# Patient Record
Sex: FEMALE | Race: BLACK OR AFRICAN AMERICAN | NOT HISPANIC OR LATINO | URBAN - METROPOLITAN AREA
[De-identification: names, ages, dates, MRNs, and addresses within clinical notes are randomized per-mention and may not be internally consistent; named-entity substitution may affect disease eponyms.]

---

## 2023-03-13 ENCOUNTER — OFFICE (OUTPATIENT)
Dept: URBAN - METROPOLITAN AREA CLINIC 102 | Facility: CLINIC | Age: 68
End: 2023-03-13
Payer: MEDICAID

## 2023-03-13 VITALS
WEIGHT: 155 LBS | TEMPERATURE: 97.5 F | HEART RATE: 91 BPM | SYSTOLIC BLOOD PRESSURE: 128 MMHG | DIASTOLIC BLOOD PRESSURE: 79 MMHG | HEIGHT: 63 IN

## 2023-03-13 DIAGNOSIS — R93.89 ABNORMAL FINDINGS ON DIAGNOSTIC IMAGING OF OTHER SPECIFIED B: ICD-10-CM

## 2023-03-13 DIAGNOSIS — R74.8 ABNORMAL LEVELS OF OTHER SERUM ENZYMES: ICD-10-CM

## 2023-03-13 DIAGNOSIS — Z78.9 OTHER SPECIFIED HEALTH STATUS: ICD-10-CM

## 2023-03-13 DIAGNOSIS — K76.0 FATTY (CHANGE OF) LIVER, NOT ELSEWHERE CLASSIFIED: ICD-10-CM

## 2023-03-13 LAB
AFP, SERUM, TUMOR MARKER: 2.7 NG/ML (ref 0–9.2)
ALPHA-1-ANTITRYPSIN, SERUM: 162 MG/DL (ref 101–187)
ANTI-MITOCHONDRIAL AB BY IFA: (no result)
ANTI-SMOOTH MUSCLE AB BY IFA: (no result)
ANTINUCLEAR ANTIBODIES, IFA: NEGATIVE
HEPATIC FUNCTION PANEL (7): ALBUMIN: 4.4 G/DL (ref 3.8–4.8)
HEPATIC FUNCTION PANEL (7): ALKALINE PHOSPHATASE: 85 IU/L (ref 44–121)
HEPATIC FUNCTION PANEL (7): ALT (SGPT): 50 IU/L — HIGH (ref 0–32)
HEPATIC FUNCTION PANEL (7): AST (SGOT): 92 IU/L — HIGH (ref 0–40)
HEPATIC FUNCTION PANEL (7): BILIRUBIN, DIRECT: <0.1 MG/DL
HEPATIC FUNCTION PANEL (7): BILIRUBIN, TOTAL: 0.2 MG/DL (ref 0–1.2)
HEPATIC FUNCTION PANEL (7): PROTEIN, TOTAL: 7.8 G/DL (ref 6–8.5)
IMMUNOGLOBULIN G, QN, SERUM: 1362 MG/DL (ref 586–1602)
LIVER-KIDNEY MICROSOMAL AB: 0.8 UNITS (ref 0–20)
MITOCHONDRIAL (M2) ANTIBODY: 91.9 UNITS — HIGH (ref 0–20)
PROTHROMBIN TIME (PT): INR: 1 (ref 0.9–1.2)
PROTHROMBIN TIME (PT): PROTHROMBIN TIME: 10.5 SEC (ref 9.1–12)

## 2023-03-13 PROCEDURE — 99204 OFFICE O/P NEW MOD 45 MIN: CPT | Performed by: PHYSICIAN ASSISTANT

## 2023-03-13 NOTE — SERVICEHPINOTES
The pt is a 67 yr old female here to discuss high LFTS. The LFTS have been high for some time--I see labs dating back to 9/2021 with an AST of 131 and ALT of 80.8 at that time. Most recent blood work from 10/22 shows a normal HA1C, and the following neg tests--hep C, hep B surface antigen and antibody with normal ferritin and ceruloplasmin isn't low. AST is 89 and ALT is 48, normal alk phos and bilirubin. U/S shows a fatty liver with nodular contour--possible early cirrhosis.  No abdominal pain or problems eating. No known family hx of GI issues. She tells me that she used to drink on weekends--she tells me that she used to "party a lot back in the day". Unclear how much she was actually drinking (evasive when answering this question). She has since stopped ETOH around October 2022. She takes turmeric and garlic supplements. No other GI related complaints today.

## 2023-03-23 ENCOUNTER — OFFICE (OUTPATIENT)
Dept: URBAN - METROPOLITAN AREA CLINIC 102 | Facility: CLINIC | Age: 68
End: 2023-03-23

## 2023-03-23 DIAGNOSIS — K76.0 FATTY (CHANGE OF) LIVER, NOT ELSEWHERE CLASSIFIED: ICD-10-CM

## 2023-03-23 DIAGNOSIS — R74.8 ABNORMAL LEVELS OF OTHER SERUM ENZYMES: ICD-10-CM

## 2023-03-23 DIAGNOSIS — Z78.9 OTHER SPECIFIED HEALTH STATUS: ICD-10-CM

## 2023-03-23 PROCEDURE — 76981 USE PARENCHYMA: CPT | Performed by: INTERNAL MEDICINE

## 2023-05-08 ENCOUNTER — OFFICE (OUTPATIENT)
Dept: URBAN - METROPOLITAN AREA CLINIC 102 | Facility: CLINIC | Age: 68
End: 2023-05-08
Payer: MEDICARE

## 2023-05-08 VITALS
WEIGHT: 156 LBS | DIASTOLIC BLOOD PRESSURE: 82 MMHG | HEART RATE: 98 BPM | TEMPERATURE: 97.8 F | SYSTOLIC BLOOD PRESSURE: 144 MMHG | HEIGHT: 63 IN

## 2023-05-08 DIAGNOSIS — R93.89 ABNORMAL FINDINGS ON DIAGNOSTIC IMAGING OF OTHER SPECIFIED B: ICD-10-CM

## 2023-05-08 DIAGNOSIS — R89.9 UNSPECIFIED ABNORMAL FINDING IN SPECIMENS FROM OTHER ORGANS,: ICD-10-CM

## 2023-05-08 DIAGNOSIS — R74.8 ABNORMAL LEVELS OF OTHER SERUM ENZYMES: ICD-10-CM

## 2023-05-08 PROCEDURE — 99214 OFFICE O/P EST MOD 30 MIN: CPT | Performed by: PHYSICIAN ASSISTANT

## 2023-05-08 NOTE — SERVICEHPINOTES
Pt is here for f/u regarding liver.   The LFTS have been high for some time--I see labs dating back to 9/2021 with an AST of 131 and ALT of 80.8 at that time. Most recent blood work from 10/22 shows a normal HA1C, and the following neg tests--hep C, hep B surface antigen and antibody with normal ferritin and ceruloplasmin isn't low. AST is 89 and ALT is 48, normal alk phos and bilirubin. U/S shows a fatty liver with nodular contour--possible early cirrhosis.No abdominal pain or problems eating. No known family hx of GI issues. She tells me that she used to drink on weekends--she tells me that she used to "party a lot back in the day". Unclear how much she was actually drinking (evasive when answering this question). She has since stopped ETOH around October 2022. She takes turmeric and garlic supplements.
br
br
Additional labs as follows--AST 92, ALT 50, AMA 92, negative CELSO, and fibroscan showed S0 and F4. She has been ETOH free since 03/1/23.

## 2023-08-01 ENCOUNTER — TELEHEALTH PROVIDED IN PATIENT'S HOME (OUTPATIENT)
Dept: URBAN - METROPOLITAN AREA TELEHEALTH 12 | Facility: TELEHEALTH | Age: 68
End: 2023-08-01

## 2023-08-01 VITALS — WEIGHT: 154 LBS | HEIGHT: 63 IN

## 2023-08-01 DIAGNOSIS — R93.89 ABNORMAL FINDINGS ON DIAGNOSTIC IMAGING OF OTHER SPECIFIED B: ICD-10-CM

## 2023-08-01 DIAGNOSIS — R89.9 UNSPECIFIED ABNORMAL FINDING IN SPECIMENS FROM OTHER ORGANS,: ICD-10-CM

## 2023-08-01 DIAGNOSIS — K74.3 PRIMARY BILIARY CIRRHOSIS: ICD-10-CM

## 2023-08-01 DIAGNOSIS — R74.8 ABNORMAL LEVELS OF OTHER SERUM ENZYMES: ICD-10-CM

## 2023-08-01 DIAGNOSIS — K76.0 FATTY (CHANGE OF) LIVER, NOT ELSEWHERE CLASSIFIED: ICD-10-CM

## 2023-08-01 PROCEDURE — 99443: CPT | Performed by: PHYSICIAN ASSISTANT

## 2023-08-01 NOTE — SERVICEHPINOTES
PATIENT VERIFIED BY DATE OF BIRTH AND NAME. Patient has been consented for this telecommunication visit. Pt is here to discuss liver bx results--she has cirrhosis, fatty liver, and PBC--read by [facility]. span style="background-color: rgb(255, 255, 0)" visited="true"Pt doesn't feel bad in anyway overall. /span  Pt can have itching at times. + fatigue. Has high cholesterol--being managed by her PCP.   Does get dry eyes and mouth--will try biotene. No signs of bleeding in the GI symptoms--no blood in the stool, black stool, hematemesis. No upper GI pain eating well and fine. No recent U/S--wt is stable, no increased abdominal girth. No significant swelling in her legs. No confusion, no sig forgetfulness, and daytime sleepiness. No ETOH use. No EGD. No other GI complaints today. ROS as per HPI and o/w is unremarkable.

## 2023-08-01 NOTE — SERVICENOTES
Patient's visit was conducted through phone communication. Patient consented before the start of visit as to understanding of privacy concerns, possible technological failure, and their responsibility of carrying out instructions of plan.  Provider was located in their home during this visit. Start time 11:35 and end time was 12:07--total phone time 32 mins and 30 seconds

## 2023-09-12 ENCOUNTER — TELEHEALTH PROVIDED OTHER THAN IN PATIENT'S HOME (OUTPATIENT)
Dept: URBAN - METROPOLITAN AREA TELEHEALTH 12 | Facility: TELEHEALTH | Age: 68
End: 2023-09-12
Payer: MEDICARE

## 2023-09-12 VITALS — HEIGHT: 63 IN | WEIGHT: 154 LBS

## 2023-09-12 DIAGNOSIS — K76.0 FATTY (CHANGE OF) LIVER, NOT ELSEWHERE CLASSIFIED: ICD-10-CM

## 2023-09-12 DIAGNOSIS — R74.8 ABNORMAL LEVELS OF OTHER SERUM ENZYMES: ICD-10-CM

## 2023-09-12 DIAGNOSIS — K74.3 PRIMARY BILIARY CIRRHOSIS: ICD-10-CM

## 2023-09-12 DIAGNOSIS — R93.89 ABNORMAL FINDINGS ON DIAGNOSTIC IMAGING OF OTHER SPECIFIED B: ICD-10-CM

## 2023-09-12 PROCEDURE — 99214 OFFICE O/P EST MOD 30 MIN: CPT | Mod: 95 | Performed by: PHYSICIAN ASSISTANT

## 2023-09-12 NOTE — SERVICENOTES
Patient's visit was conducted through [employer] video telecommunication. Patient consented before the start of visit as to understanding of privacy concerns, possible technological failure, and their responsibility of carrying out instructions of plan.

## 2023-09-12 NOTE — SERVICEHPINOTES
PATIENT VERIFIED BY DATE OF BIRTH AND NAME. Patient has been consented for this telecommunication visit.   Pt is here for f/u to newly diagnosed PBC/cirrhosis--discovered upon liver bx.   Liver enzymes have come down mildly since starting megan--covered well by insurance--has been taking it as prescribed. AFP and U/S do not suggest any evidence of HCC.  She is scheduled next month for an EGD to screen for esophageal varices. 
br
br
Since our last visit, she has been feeling "pretty good". Has muscle spasms (has for yrs) and this is stable. Feels a little less fatigued as compared to previous. Using biotene for a dry mouth--this is helping. No blood in the stool, melena, hematemesis, nausea, vomiting, abdominal pain. 
br
br
No other GI related complaints today. ROS as per HPI and o/w is unremarkable.

## 2023-10-16 ENCOUNTER — ON CAMPUS - OUTPATIENT (OUTPATIENT)
Dept: URBAN - METROPOLITAN AREA HOSPITAL 16 | Facility: HOSPITAL | Age: 68
End: 2023-10-16

## 2023-10-16 DIAGNOSIS — K29.50 UNSPECIFIED CHRONIC GASTRITIS WITHOUT BLEEDING: ICD-10-CM

## 2023-10-16 DIAGNOSIS — K25.9 GASTRIC ULCER, UNSPECIFIED AS ACUTE OR CHRONIC, WITHOUT HEMO: ICD-10-CM

## 2023-10-16 DIAGNOSIS — B96.81 HELICOBACTER PYLORI [H. PYLORI] AS THE CAUSE OF DISEASES CLA: ICD-10-CM

## 2023-10-16 DIAGNOSIS — K31.A11 GASTRIC INTESTINAL METAPLASIA WITHOUT DYSPLASIA, INVOLVING T: ICD-10-CM

## 2023-10-16 DIAGNOSIS — K44.9 DIAPHRAGMATIC HERNIA WITHOUT OBSTRUCTION OR GANGRENE: ICD-10-CM

## 2023-10-16 DIAGNOSIS — K31.7 POLYP OF STOMACH AND DUODENUM: ICD-10-CM

## 2023-10-16 PROCEDURE — 43239 EGD BIOPSY SINGLE/MULTIPLE: CPT | Performed by: INTERNAL MEDICINE

## 2024-02-23 PROBLEM — K74.60 CIRRHOSIS: Status: ACTIVE | Noted: 2023-11-17

## 2024-03-18 ENCOUNTER — ON CAMPUS - OUTPATIENT (OUTPATIENT)
Dept: URBAN - METROPOLITAN AREA HOSPITAL 16 | Facility: HOSPITAL | Age: 69
End: 2024-03-18
Payer: MEDICARE

## 2024-03-18 DIAGNOSIS — Z87.11 PERSONAL HISTORY OF PEPTIC ULCER DISEASE: ICD-10-CM

## 2024-03-18 DIAGNOSIS — K29.50 UNSPECIFIED CHRONIC GASTRITIS WITHOUT BLEEDING: ICD-10-CM

## 2024-03-18 DIAGNOSIS — K44.9 DIAPHRAGMATIC HERNIA WITHOUT OBSTRUCTION OR GANGRENE: ICD-10-CM

## 2024-03-18 PROCEDURE — 43239 EGD BIOPSY SINGLE/MULTIPLE: CPT | Performed by: INTERNAL MEDICINE
